# Patient Record
Sex: MALE | Race: OTHER | URBAN - METROPOLITAN AREA
[De-identification: names, ages, dates, MRNs, and addresses within clinical notes are randomized per-mention and may not be internally consistent; named-entity substitution may affect disease eponyms.]

---

## 2017-01-12 ENCOUNTER — OFFICE VISIT (OUTPATIENT)
Dept: FAMILY MEDICINE CLINIC | Age: 57
End: 2017-01-12

## 2017-01-12 VITALS
TEMPERATURE: 98.7 F | BODY MASS INDEX: 35.32 KG/M2 | DIASTOLIC BLOOD PRESSURE: 110 MMHG | SYSTOLIC BLOOD PRESSURE: 163 MMHG | RESPIRATION RATE: 12 BRPM | HEART RATE: 86 BPM | WEIGHT: 212 LBS | OXYGEN SATURATION: 99 % | HEIGHT: 65 IN

## 2017-01-12 DIAGNOSIS — Z01.818 PREOP EXAM FOR INTERNAL MEDICINE: ICD-10-CM

## 2017-01-12 DIAGNOSIS — H26.9 CATARACT: Primary | ICD-10-CM

## 2017-01-12 DIAGNOSIS — E78.00 HYPERCHOLESTEREMIA: ICD-10-CM

## 2017-01-12 DIAGNOSIS — I10 ESSENTIAL HYPERTENSION WITH GOAL BLOOD PRESSURE LESS THAN 140/90: ICD-10-CM

## 2017-01-12 RX ORDER — AMLODIPINE BESYLATE 5 MG/1
5 TABLET ORAL DAILY
Qty: 30 TAB | Refills: 5 | Status: SHIPPED | OUTPATIENT
Start: 2017-01-12

## 2017-01-12 RX ORDER — LISINOPRIL AND HYDROCHLOROTHIAZIDE 20; 25 MG/1; MG/1
1 TABLET ORAL DAILY
Qty: 30 TAB | Refills: 5 | Status: SHIPPED | OUTPATIENT
Start: 2017-01-12

## 2017-01-12 RX ORDER — SIMVASTATIN 40 MG/1
20 TABLET, FILM COATED ORAL
Qty: 30 TAB | Refills: 5 | Status: SHIPPED | OUTPATIENT
Start: 2017-01-12 | End: 2018-03-20 | Stop reason: SDUPTHER

## 2017-01-12 NOTE — MR AVS SNAPSHOT
Visit Information Date & Time Provider Department Dept. Phone Encounter #  
 1/12/2017  8:30 AM Ephraim Agustin NP [address] [facility] [phone] [id number] Follow-up Instructions Return if symptoms worsen or fail to improve. Upcoming Health Maintenance Date Due FOBT Q 1 YEAR AGE 50-75 6/1/2010 DTaP/Tdap/Td series (2 - Td) 5/26/2026 Allergies as of 1/12/2017  Review Complete On: 1/12/2017 By: Armando Leslie LPN No Known Allergies Current Immunizations  Reviewed on 5/26/2016 Name Date Tdap 5/26/2016 Not reviewed this visit You Were Diagnosed With   
  
 Codes Comments Cataract    -  Primary ICD-10-CM: H26.9 ICD-9-CM: 366.9 Preop exam for internal medicine     ICD-10-CM: Z01.818 ICD-9-CM: V72.83 Essential hypertension with goal blood pressure less than 140/90     ICD-10-CM: I10 
ICD-9-CM: 401.9 Hypercholesteremia     ICD-10-CM: E78.00 ICD-9-CM: 272.0 Vitals BP Pulse Temp Resp Height(growth percentile) Weight(growth percentile) (!) 163/110 (BP 1 Location: Right arm) 86 98.7 °F (37.1 °C) 12 5' 5\" (1.651 m) 212 lb (96.2 kg) SpO2 BMI Smoking Status 99% 35.28 kg/m2 Never Smoker Vitals History BMI and BSA Data Body Mass Index Body Surface Area  
 35.28 kg/m 2 2.1 m 2 Preferred Pharmacy Pharmacy Name Phone [address] Your Updated Medication List  
  
   
This list is accurate as of: 1/12/17  9:27 AM.  Always use your most recent med list. amLODIPine 5 mg tablet Commonly known as:  Karina Duran Take 1 Tab by mouth daily. guaiFENesin-codeine 100-10 mg/5 mL solution Commonly known as:  [facility] Take 5 mL by mouth three (3) times daily as needed for Cough. Max Daily Amount: 15 mL.  
  
 ketorolac 0.4 % Drop Commonly known as:  ACULAR LS  
  
 lisinopril-hydroCHLOROthiazide 20-25 mg per tablet Commonly known as:  Harley Decent Take 1 Tab by mouth daily. ofloxacin 0.3 % ophthalmic solution Commonly known as:  FLOXIN Apply 0.3 Drops to eye as directed. prednisoLONE acetate 1 % ophthalmic suspension Commonly known as:  PRED FORTE  
  
 simvastatin 40 mg tablet Commonly known as:  ZOCOR Take 0.5 Tabs by mouth nightly. Prescriptions Sent to Pharmacy Refills  
 lisinopril-hydroCHLOROthiazide (PRINZIDE, ZESTORETIC) 20-25 mg per tablet 5 Sig: Take 1 Tab by mouth daily. Class: Normal  
 Pharmacy: [address] Ph #: [phone] Route: Oral  
 amLODIPine (NORVASC) 5 mg tablet 5 Sig: Take 1 Tab by mouth daily. Class: Normal  
 Pharmacy: [address] Ph #: [phone] Route: Oral  
 simvastatin (ZOCOR) 40 mg tablet 5 Sig: Take 0.5 Tabs by mouth nightly. Class: Normal  
 Pharmacy: [address] Ph #: [phone] Route: Oral  
  
Follow-up Instructions Return if symptoms worsen or fail to improve. Patient Instructions GOAL BLOOD PRESSURE:  140/90 or lower Presión arterial wong: Instrucciones de cuidado - [ High Blood Pressure: Care Instructions ] Instrucciones de cuidado Si medina presión arterial suele ser superior a 140/90, usted tiene presión arterial wong o hipertensión. [place] significa que el número de [place] es 140 o superior o que el número de abajo es 90 o superior, o ambas cosas. A pesar de lo que mucha gente garrett, la hipertensión no suele causar dolor de joseph ni provocar mareos o aturdimiento. Generalmente, no presenta síntomas. Sin embargo, aumenta el riesgo de ataque al corazón, ataque cerebral, y daños en los riñones o en los ojos.  A mayor presión arterial, mayor riesgo. Medina médico le dará marie meta para medina presión arterial. Medina meta se basará en medina janelle y medina edad. Un ejemplo de meta es mantener medina presión arterial por debajo de 140/90. Los cambios de estilo de carlee, kirby alimentarse en forma saludable y KOT[place], siempre son importantes para ayudarle a bajar la presión arterial. También podría isak medicamentos para lograr medina meta para la presión arterial. 
La atención de seguimiento es marie parte clave de medina tratamiento y seguridad. Asegúrese de hacer y acudir a todas las citas, y llame a medina médico si está teniendo problemas. También es marie buena idea saber los resultados de gurpreet exámenes y mantener marie lista de los medicamentos que cyndy. Cómo puede cuidarse en el hogar? Tratamiento médico 
· Si michel de isak gurpreet medicamentos, medina presión arterial volverá a subir. Es posible que deba isak un tipo de [place], o más de [place], para reducir la presión arterial. Sea gian con los medicamentos. [place] medina medicamento exactamente kirby se lo hayan indicado. Llame a medina médico si garrett estar teniendo problemas con medina medicamento. · Hable con medina médico antes de empezar a isak Enid Santiago. La aspirina puede ayudar a determinadas personas a reducir medina riesgo de tener un ataque cardíaco o un ataque cerebral. [place] isak aspirina no es adecuado para todo el TRENGEREID, debido a que puede causar sangrado grave. · Visite a medina médico periódicamente. Usted podría necesitar consultar a medina médico con más frecuencia al principio o hasta que se reduzca medina presión arterial. 
· Si usted está tomando medicamentos para la presión arterial, hable con medina médico antes de isak medicamentos descongestionantes o antiinflamatorios, kirby ibuprofeno. Algunos de estos medicamentos pueden elevar la presión arterial. 
· Aprenda a revisarse la presión arterial en medina hogar. Cambios en el estilo de carlee · Mantenga un peso saludable.  [place] es particularmente importante si aumenta de peso en la fern de la cintura. Bajar solo 10 libras (4.5 kg) puede ayudarle a reducir medina presión arterial. 
· Sami más ejercicios si medina médico se lo recomienda. Caminar es marie buena opción. Poco a poco, aumente la distancia que [employer]. Intente hacer por lo menos 30 minutos de ejercicio la mayoría de los días de la [place]. También podría nadar, montar en bicicleta o hacer otras actividades. · No tome alcohol o limite la cantidad que venita. Hable con medina médico acerca de si puede isak alcohol. · Trate de limitar la cantidad de sodio que consume a menos de 2,300 miligramos (mg) al día. Medina médico podría pedirle que trate de consumir menos de 1,500 mg al día. · Coma abundantes frutas (kirby bananas [plátanos] y naranjas), verduras, legumbres, granos integrales y productos lácteos de bajo contenido de [place]. · Reduzca la cantidad de grasas saturadas en medina dieta. Los productos derivados de los [place], kirby la [place], el queso y la carne, contienen grasas saturadas. El limitar estos alimentos podría ayudarle a bajar de peso y [place] reducir medina riesgo de marie enfermedad cardíaca. · No fume. El hábito de fumar aumenta medina riesgo de ataque cerebral y ataque al corazón. Si necesita ayuda para dejar de fumar, hable con medina médico sobre programas y medicamentos para dejar de fumar. Estos pueden aumentar gurpreet probabilidades de dejar el hábito para siempre. Cuándo debe pedir ayuda? Llame al 911 en cualquier momento que considere que necesita atención de [place]. [place] puede significar que tiene síntomas que sugieren que medina presión arterial le está causando un problema cardíaco o vascular grave. Medina presión arterial podría ser superior a 180/110. Por ejemplo, llame al 911 si: · Tiene síntomas de un ataque al corazón. Estos pueden incluir: ¨ Dolor o presión en el pecho, o marie sensación extraña en el pecho. ¨ Sudoración. ¨ Falta de aire. ¨ Náuseas o vómito. ¨ Dolor, presión o marie sensación extraña en la espalda, el abdiaziz, la mandíbula, la parte superior del abdomen o en arsenio o ambos hombros o brazos. ¨ Aturdimiento o debilidad repentina. ¨ Latidos del corazón rápidos o irregulares. · Tiene síntomas de un ataque cerebral. Estos pueden incluir: 
¨ Entumecimiento, hormigueo, debilidad o parálisis repentinos en la ariana, el brazo o la pierna, sobre todo en un solo lado del cuerpo. ¨ Cambios repentinos en la visión. ¨ Dificultades repentinas para hablar. ¨ Confusión repentina o dificultad súbita para comprender frases sencillas. ¨ Problemas repentinos para caminar o mantener el equilibrio. ¨ Dolor de [place] intenso y repentino, distinto de los ceasar de joseph anteriores. · Tiene un dolor intenso en la espalda o el abdomen. No espere a que la presión arterial baje por sí rosaura. Obtenga ayuda de inmediato. Llame a medina médico ahora mismo o busque atención de inmediato si: · Tiene la presión arterial mucho más wong de lo normal (kirby 180/110 o más wong), gabe no tiene síntomas. · Piensa que la presión arterial wong le está provocando síntomas, kirby: ¨ Dolor de joseph intenso. [address]. Vigile de cerca los [facility], y asegúrese de comunicarse con medina médico si: 
· Medina presión arterial mide 140/90 o más en al menos 2 ocasiones. [place] significa que el número de [place] es 140 o superior o el número de abajo es 90 o superior, o ambas cosas. · Ludmila que podría estar teniendo efectos secundarios de los medicamentos para la presión arterial. 
· Medina presión arterial suele ser normal, gabe se eleva por encima de lo normal en al menos 2 ocasiones. Dónde puede encontrar más información en inglés? Denisse Cisneros a http://whit-eben.info/. Alejandro Goon [id number] en la búsqueda para aprender más acerca de \"Presión arterial wong: Instrucciones de cuidado - [ High Blood Pressure: Care Instructions ]. \" 
Revisado: 8 agosto, 2016 Versión del contenido: 11.1 © 4592-1575 Healthwise, Incorporated. Las instrucciones de cuidado fueron adaptadas bajo licencia por [employer] (which disclaims liability or warranty for this information). Si usted tiene [place] afección médica o sobre estas instrucciones, siempre pregunte a medina profesional de janelle. Healthwise, Incorporated niega toda garantía o responsabilidad por medina uso de esta información. Introducing [facility]! [employer] introduces [employer] patient portal. Now you can access parts of your medical record, email your doctor's office, and request medication refills online. 1. In your internet browser, go to [email or web address] 2. Click on the First Time User? Click Here link in the Sign In box. You will see the New Member Sign Up page. 3. Enter your [employer] Access Code exactly as it appears below. You will not need to use this code after youve completed the sign-up process. If you do not sign up before the expiration date, you must request a new code. · [employer] Access Code: [email or web address] Expires: 3/22/2017  8:58 AM 
 
4. Enter the last four digits of your Social Security Number (xxxx) and Date of Birth (mm/dd/yyyy) as indicated and click Submit. You will be taken to the next sign-up page. 5. Create a [facility] ID. This will be your [employer] login ID and cannot be changed, so think of one that is secure and easy to remember. 6. Create a [employer] password. You can change your password at any time. 7. Enter your Password Reset Question and Answer. This can be used at a later time if you forget your password. 8. Enter your e-mail address. You will receive e-mail notification when new information is available in [address] 9. Click Sign Up. You can now view and download portions of your medical record. 10. Click the Download Summary menu link to download a portable copy of your medical information. If you have questions, please visit the Frequently Asked Questions section of the [employer] website. Remember, [employer] is NOT to be used for urgent needs. For medical emergencies, dial 911. Now available from your iPhone and Android! Please provide this summary of care documentation to your next provider. Your primary care clinician is listed as Taya Sanchez. If you have any questions after today's visit, please call [phone].

## 2017-01-12 NOTE — PATIENT INSTRUCTIONS
GOAL BLOOD PRESSURE:  140/90 or lower      Presión arterial wong: Instrucciones de cuidado - [ High Blood Pressure: Care Instructions ]  Instrucciones de cuidado  Si medina presión arterial suele ser superior a 140/90, usted tiene presión arterial wong o hipertensión. [place] significa que el número de [place] es 140 o superior o que el número de abajo es 90 o superior, o ambas cosas. A pesar de lo que mucha gente garrett, la hipertensión no suele causar dolor de joseph ni provocar mareos o aturdimiento. Generalmente, no presenta síntomas. Sin embargo, aumenta el riesgo de ataque al corazón, ataque cerebral, y daños en los riñones o en los ojos. A mayor presión arterial, mayor riesgo. Medina médico le dará marie meta para medina presión arterial. Medina meta se basará en medina janelle y medina edad. Un ejemplo de meta es mantener medina presión arterial por debajo de 140/90. Los cambios de estilo de carlee, kirby alimentarse en forma saludable y [place], siempre son importantes para ayudarle a bajar la presión arterial. También podría isak medicamentos para lograr medina meta para la presión arterial.  La atención de seguimiento es marie parte clave de medina tratamiento y seguridad. Asegúrese de hacer y acudir a todas las citas, y llame a medina médico si está teniendo problemas. También es marie buena idea saber los resultados de gurpreet exámenes y mantener marie lista de los medicamentos que cyndy. ¿Cómo puede cuidarse en el hogar? Tratamiento médico  · Si michel de isak gurpreet medicamentos, medina presión arterial volverá a subir. Es posible que deba isak un tipo de [place], o más de [place], para reducir la presión arterial. Sea gian con los medicamentos. [place] medina medicamento exactamente kirby se lo hayan indicado. Llame a medina médico si garrett estar teniendo problemas con medina medicamento. · Hable con medina médico antes de empezar a isak [facility].  La aspirina puede ayudar a determinadas personas a reducir medina riesgo de tener un ataque cardíaco o un ataque cerebral. Jay isak aspirina no es adecuado para todo el TRENGEREID, debido a que puede causar sangrado grave. · Visite a medina médico periódicamente. Usted podría necesitar consultar a medina médico con más frecuencia al principio o hasta que se reduzca medina presión arterial.  · Si usted está tomando medicamentos para la presión arterial, hable con medina médico antes de isak medicamentos descongestionantes o antiinflamatorios, kirby ibuprofeno. Algunos de estos medicamentos pueden elevar la presión arterial.  · Aprenda a revisarse la presión arterial en medina hogar. Cambios en el estilo de carlee  · Mantenga un peso saludable. [place] es particularmente importante si aumenta de peso en la fern de la cintura. Bajar solo 10 libras (4.5 kg) puede ayudarle a reducir medina presión arterial.  · Sami más ejercicios si medina médico se lo recomienda. Caminar es marie buena opción. Poco a poco, aumente la distancia que [employer]. Intente hacer por lo menos 30 minutos de ejercicio la mayoría de los días de la [place]. También podría nadar, montar en bicicleta o hacer otras actividades. · No tome alcohol o limite la cantidad que venita. Hable con medina médico acerca de si puede isak alcohol. · Trate de limitar la cantidad de sodio que consume a menos de 2,300 miligramos (mg) al día. Medina médico podría pedirle que trate de consumir menos de 1,500 mg al día. · Coma abundantes frutas (kirby bananas [plátanos] y naranjas), verduras, legumbres, granos integrales y productos lácteos de bajo contenido de [place]. · Reduzca la cantidad de grasas saturadas en medina dieta. Los productos derivados de los [place], kirby la [place], el queso y la carne, contienen grasas saturadas. El limitar estos alimentos podría ayudarle a bajar de peso y [place] reducir medina riesgo de marie enfermedad cardíaca. · No fume. El hábito de fumar aumenta medina riesgo de ataque cerebral y ataque al corazón.  Si necesita ayuda para dejar de fumar, hable con medina médico sobre programas y medicamentos para dejar de fumar. Estos pueden aumentar gurpreet probabilidades de dejar el hábito para siempre. ¿Cuándo debe pedir ayuda? Llame al 911 en cualquier momento que considere que necesita atención de [place]. [place] puede significar que tiene síntomas que sugieren que medina presión arterial le está causando un problema cardíaco o vascular grave. Medina presión arterial podría ser superior a 180/110. Por ejemplo, llame al 911 si:  · Tiene síntomas de un ataque al corazón. Estos pueden incluir:  ¨ Dolor o presión en el pecho, o marie sensación extraña en el pecho. ¨ Sudoración. ¨ Falta de aire. ¨ Náuseas o vómito. ¨ Dolor, presión o marie sensación extraña en la espalda, el abdiaziz, la mandíbula, la parte superior del abdomen o en arsenio o ambos hombros o brazos. ¨ Aturdimiento o debilidad repentina. ¨ Latidos del corazón rápidos o irregulares. · Tiene síntomas de un ataque cerebral. Estos pueden incluir:  ¨ Entumecimiento, hormigueo, debilidad o parálisis repentinos en la ariana, el brazo o la pierna, sobre todo en un solo lado del cuerpo. ¨ Cambios repentinos en la visión. ¨ Dificultades repentinas para hablar. ¨ Confusión repentina o dificultad súbita para comprender frases sencillas. ¨ Problemas repentinos para caminar o mantener el equilibrio. ¨ Dolor de [place] intenso y repentino, distinto de los ceasar de joseph anteriores. · Tiene un dolor intenso en la espalda o el abdomen. No espere a que la presión arterial baje por sí rosaura. Obtenga ayuda de inmediato. Llame a medina médico ahora mismo o busque atención de inmediato si:  · Tiene la presión arterial mucho más wong de lo normal (kirby 180/110 o más wong), gabe no tiene síntomas. · Piensa que la presión arterial wong le está provocando síntomas, kirby:  ¨ Dolor de joseph intenso. Õpet[address] 63. Vigile de cerca los [place], y asegúrese de comunicarse con medina médico si:  · Medina presión arterial mide 140/90 o más en al menos 2 ocasiones.  [place] significa que el número de [place] es 140 o superior o el número de abajo es 90 o superior, o ambas cosas. · Ludmila que podría estar teniendo efectos secundarios de los medicamentos para la presión arterial.  · Medina presión arterial suele ser normal, gabe se eleva por encima de lo normal en al menos 2 ocasiones. ¿Dónde puede encontrar más información en inglés? Иван Holt a http://whit-eben.info/. Francisco Meza L985 en la búsqueda para aprender más acerca de \"Presión arterial wong: Instrucciones de cuidado - [ High Blood Pressure: Care Instructions ]. \"  Revisado: 8 agosto, 2016  Versión del contenido: 11.1  © 6589-3205 Healthwise, Incorporated. Las instrucciones de cuidado fueron adaptadas bajo licencia por [facility] (which disclaims liability or warranty for this information). Si [facility] tiene [place] afección médica o sobre estas instrucciones, siempre pregunte a medina profesional de janelle. Healthwise, Incorporated niega toda garantía o responsabilidad por medina uso de esta información.

## 2017-01-12 NOTE — PROGRESS NOTES
Maxine Wolf is a 64 y.o. male is a 64 y.o. yo male who presents for preoperative evaluation. Latex Allergy:NO    History of anesthesia reaction: NO    History of PE/DVT:NO    Pt has been taking HTN medication daily he says. Checks BP at home and continues to run high. No vision changes, chest pain, dizziness. Of note, pt no longer has insurance. No Known Allergies    Current Outpatient Prescriptions   Medication Sig    lisinopril-hydroCHLOROthiazide (PRINZIDE, ZESTORETIC) 20-25 mg per tablet Take 1 Tab by mouth daily.  amLODIPine (NORVASC) 5 mg tablet Take 1 Tab by mouth daily.  simvastatin (ZOCOR) 40 mg tablet Take 0.5 Tabs by mouth nightly.  ofloxacin (FLOXIN) 0.3 % ophthalmic solution Apply 0.3 Drops to eye as directed.  guaiFENesin-codeine (CHERATUSSIN AC) 100-10 mg/5 mL solution Take 5 mL by mouth three (3) times daily as needed for Cough. Max Daily Amount: 15 mL.  ketorolac (ACULAR LS) 0.4 % drop     prednisoLONE acetate (PRED FORTE) 1 % ophthalmic suspension      No current facility-administered medications for this visit. Patient Active Problem List   Diagnosis Code    Essential hypertension with goal blood pressure less than 140/90 I10    Hypercholesteremia E78.00     History reviewed. No pertinent past surgical history. Reviewed PmHx, RxHx, FmHx, SocHx, AllgHx and updated and dated in the chart.     Review of Systems - negative except as listed above in the HPI    Objective:     Vitals:    01/12/17 0853 01/12/17 0858   BP: (!) 187/120 (!) 163/110   Pulse: 91 86   Resp: 12    Temp: 98.7 °F (37.1 °C)    SpO2: 99%    Weight: 212 lb (96.2 kg)    Height: 5' 5\" (1.651 m)      Physical Examination: General appearance - alert, well appearing, and in no distress  Ears - bilateral TM's and external ear canals normal  Nose - normal and patent, no erythema, discharge or polyps  Mouth - mucous membranes moist, pharynx normal without lesions  Neck - supple, no significant adenopathy  Chest - clear to auscultation, no wheezes, rales or rhonchi, symmetric air entry  Heart - normal rate, regular rhythm, normal S1, S2, no murmurs, rubs, clicks or gallops  Extremities - peripheral pulses normal, no pedal edema, no clubbing or cyanosis    Assessment/ Plan:   Duyen Alexander was seen today for follow-up and pre-op exam.    Diagnoses and all orders for this visit:    Cataract  Preop exam for internal medicine  Cleared for surgery. Essential hypertension with goal blood pressure less than 140/90  -     lisinopril-hydroCHLOROthiazide (PRINZIDE, ZESTORETIC) 20-25 mg per tablet; Take 1 Tab by mouth daily. -     amLODIPine (NORVASC) 5 mg tablet; Take 1 Tab by mouth daily. Add on rx. F/U 1 mo. Encouraged pt to monitor BP at home, preferably in AM when first wakes up. Goal BP is < 130/90 if on meds. Discussed consequences of uncontrolled HTN and need for yearly eye exam. Recommended pt limit salt intake and engage in regular exercise. Hypercholesteremia  -     simvastatin (ZOCOR) 40 mg tablet; Take 0.5 Tabs by mouth nightly. New rx. Discussed need for low fat diet, rich in fruits and vegetables. Encouraged regular meals and daily exercise of at least 20 minutes. Reviewed sequela of high cholesterol on heart and brain health. F/U 3 mo. Follow-up Disposition:  Return if symptoms worsen or fail to improve. I have discussed the diagnosis with the patient and the intended plan as seen in the above orders. The patient has received an after-visit summary and questions were answered concerning future plans.      Medication Side Effects and Warnings were discussed with patient    Abhishek Wing NP

## 2023-03-20 ENCOUNTER — HOSPITAL ENCOUNTER (OUTPATIENT)
Dept: LAB | Age: 63
Discharge: HOME | End: 2023-03-20

## 2023-03-20 LAB
ALBUMIN SERPL-MCNC: 2.4 G/DL (ref 3.5–5)
ALBUMIN/GLOB SERPL: 0.5 (ref 1.1–2.2)
ALP SERPL-CCNC: 118 U/L (ref 45–117)
ALT SERPL-CCNC: 72 U/L (ref 12–78)
ANION GAP SERPL CALC-SCNC: 1 MMOL/L (ref 5–15)
AST SERPL W P-5'-P-CCNC: 54 U/L (ref 15–37)
BILIRUB SERPL-MCNC: 0.3 MG/DL (ref 0.2–1)
BUN SERPL-MCNC: 13 MG/DL (ref 6–20)
BUN/CREAT SERPL: 18 (ref 12–20)
CA-I BLD-MCNC: 8.4 MG/DL (ref 8.5–10.1)
CHLORIDE SERPL-SCNC: 109 MMOL/L (ref 97–108)
CO2 SERPL-SCNC: 25 MMOL/L (ref 21–32)
CREAT SERPL-MCNC: 0.73 MG/DL (ref 0.7–1.3)
GLOBULIN SER CALC-MCNC: 4.7 G/DL (ref 2–4)
GLUCOSE SERPL-MCNC: 106 MG/DL (ref 65–100)
POTASSIUM SERPL-SCNC: 3.7 MMOL/L (ref 3.5–5.1)
PROT SERPL-MCNC: 7.1 G/DL (ref 6.4–8.2)
SODIUM SERPL-SCNC: 135 MMOL/L (ref 136–145)

## 2023-03-20 PROCEDURE — 80053 COMPREHEN METABOLIC PANEL: CPT

## 2023-03-21 ENCOUNTER — TRANSCRIBE ORDER (OUTPATIENT)
Dept: SCHEDULING | Age: 63
End: 2023-03-21

## 2023-03-21 DIAGNOSIS — R13.13 PHARYNGEAL DYSPHAGIA: Primary | ICD-10-CM

## 2023-03-22 ENCOUNTER — HOSPITAL ENCOUNTER (OUTPATIENT)
Dept: GENERAL RADIOLOGY | Age: 63
Discharge: HOME | End: 2023-03-22
Attending: PHYSICAL MEDICINE & REHABILITATION

## 2023-03-22 DIAGNOSIS — R13.13 PHARYNGEAL DYSPHAGIA: ICD-10-CM

## 2023-03-22 PROCEDURE — 74011000250 HC RX REV CODE- 250: Performed by: PHYSICAL MEDICINE & REHABILITATION

## 2023-03-22 PROCEDURE — 74230 X-RAY XM SWLNG FUNCJ C+: CPT

## 2023-03-22 PROCEDURE — 92611 MOTION FLUOROSCOPY/SWALLOW: CPT

## 2023-03-22 RX ADMIN — BARIUM SULFATE 30 ML: 400 PASTE ORAL at 10:00

## 2023-03-22 RX ADMIN — BARIUM SULFATE 80 ML: 0.81 POWDER, FOR SUSPENSION ORAL at 10:20

## 2023-03-22 NOTE — PROGRESS NOTES
SPEECH PATHOLOGY MODIFIED BARIUM SWALLOW STUDY  Patient: Paul Rhoades (89 y.o. male)  Date: 3/22/2023  Primary Diagnosis: Pharyngeal dysphagia [R13.13]       Precautions:    ASSESSMENT :  Based on the objective data described below, the patient presents with largely wfl oropharyngeal swallow fxn. Oral phase c/b adequate mastication and bolus manipulation w/ trace oral residue. All consistencies initiate at the level of the vallecula. Overall, timely swallow initiation. BOT retraction, HLE, pharyngeal constriction and epiglottic inversion are wfl. Trace flash penetration x1 w/ sequential straw sips of thin. This was not reduplicated. No pen/asp w/ remaining consistencies. Trace pharyngeal residue that clears w/ double swallow. UES wfl. Cough noted prior to [facility] and throughout [facility] [place] w/o evidence of pen/asp or pharyngeal reflux. PLAN :  Recommendations and Planned Interventions:  Rec regular/thin diet w/ general asp/GERD precautions. SUBJECTIVE:   Son reports vocal fold injection s/t vocal fold paralysis and surgery s/t aortic dissection. Patient denies dysphagia. OBJECTIVE:     Past Medical History:   Diagnosis Date    Hypercholesterolemia     Hypertension      Past Surgical History:   Procedure Laterality Date     ORTHOPAEDIC  1985    repair of left shoulder dislocation            Video Flouroscopic Procedures  [x] Lateral View   [] A-P View [] Scanned to level of Sternum    [] Seated at 90 deg.   [] Other:    Presentation:   [] Spoon   [x] Cup   [x] Straw   [] Syringe   [x] Consecutive Swallows  [] Other:    Consistencies:   [x] Ba+ liquid   [] Ba+ liquid (nectar)   [] Ba+ liquid (honey)     [x] Ba+ pudding   [] Ba+ crunched cookie   [x] Ba+ cookie   [] Other:         Decreased Tongue Base Retraction?: No  Laryngeal Elevation: WFL (within functional limits)  Aspiration/Penetration Score: 2 (Penetration/No residue-Contrast enters the airway penetrates, remains above the folds/cords, and is cleared)  Pharyngeal Symmetry: Symmetrical  Pharyngeal-Esophageal Segment: No impairment  Pharyngeal Dysfunction: None    Oral Phase Severity: No impairment  Pharyngeal Phase Severity: N/A      COMMUNICATION/EDUCATION:   Patient and son receptive of education regarding MBS results, diet recs, s/s aspiration and aspiration precautions.      Thank you for this referral.  Karishma Nava M.S., M.Ed., CCC-SLP  Time Calculation: 24 mins

## 2023-05-16 RX ORDER — AMLODIPINE BESYLATE 5 MG/1
5 TABLET ORAL DAILY
COMMUNITY
Start: 2017-01-12

## 2023-05-16 RX ORDER — OFLOXACIN 3 MG/ML
0.3 SOLUTION/ DROPS OPHTHALMIC
COMMUNITY
Start: 2016-12-05

## 2023-05-16 RX ORDER — PREDNISOLONE ACETATE 10 MG/ML
SUSPENSION/ DROPS OPHTHALMIC
COMMUNITY
Start: 2016-12-05

## 2023-05-16 RX ORDER — SIMVASTATIN 40 MG
20 TABLET ORAL
COMMUNITY
Start: 2018-03-20

## 2023-05-16 RX ORDER — LISINOPRIL AND HYDROCHLOROTHIAZIDE 25; 20 MG/1; MG/1
1 TABLET ORAL DAILY
COMMUNITY
Start: 2015-01-12

## 2023-05-16 RX ORDER — LOVASTATIN 40 MG/1
1 TABLET ORAL NIGHTLY
COMMUNITY
Start: 2015-01-12

## 2023-05-16 RX ORDER — VERAPAMIL HYDROCHLORIDE 240 MG/1
1 TABLET, FILM COATED, EXTENDED RELEASE ORAL NIGHTLY
COMMUNITY
Start: 2015-01-12

## 2023-05-16 RX ORDER — KETOROLAC TROMETHAMINE 4 MG/ML
SOLUTION/ DROPS OPHTHALMIC
COMMUNITY
Start: 2016-12-05

## 2023-05-16 RX ORDER — GUAIFENESIN AND CODEINE PHOSPHATE 100; 10 MG/5ML; MG/5ML
5 SOLUTION ORAL 3 TIMES DAILY PRN
COMMUNITY
Start: 2016-12-22